# Patient Record
Sex: MALE | Race: WHITE | NOT HISPANIC OR LATINO | URBAN - METROPOLITAN AREA
[De-identification: names, ages, dates, MRNs, and addresses within clinical notes are randomized per-mention and may not be internally consistent; named-entity substitution may affect disease eponyms.]

---
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
## 2025-05-13 NOTE — LETTER
March 13, 2018         Patient: Waldo Yan   YOB: 2005   Date of Visit: 3/13/2018           To Whom it May Concern:    Waldo Yan was seen in my clinic on 3/13/2018. He may return to school on 3/14/2018..    If you have any questions or concerns, please don't hesitate to call.        Sincerely,           Stu Alarcon M.D.  Electronically Signed      Expected Date Of Service: 05/13/2025 Performing Laboratory: 0 Billing Type: Third-Party Bill Bill For Surgical Tray: no